# Patient Record
Sex: FEMALE | Race: WHITE | Employment: OTHER | URBAN - METROPOLITAN AREA
[De-identification: names, ages, dates, MRNs, and addresses within clinical notes are randomized per-mention and may not be internally consistent; named-entity substitution may affect disease eponyms.]

---

## 2017-10-23 ENCOUNTER — OP HISTORICAL/CONVERTED ENCOUNTER (OUTPATIENT)
Dept: OTHER | Age: 77
End: 2017-10-23

## 2018-03-17 ENCOUNTER — ED HISTORICAL/CONVERTED ENCOUNTER (OUTPATIENT)
Dept: OTHER | Age: 78
End: 2018-03-17

## 2018-05-02 ENCOUNTER — OP HISTORICAL/CONVERTED ENCOUNTER (OUTPATIENT)
Dept: OTHER | Age: 78
End: 2018-05-02

## 2019-05-14 ENCOUNTER — OP HISTORICAL/CONVERTED ENCOUNTER (OUTPATIENT)
Dept: OTHER | Age: 79
End: 2019-05-14

## 2020-08-04 LAB
ALBUMIN SERPL-MCNC: 4.6 G/DL (ref 3.7–4.7)
ALBUMIN/GLOB SERPL: 2 {RATIO} (ref 1.2–2.2)
ALP SERPL-CCNC: 76 IU/L (ref 39–117)
ALT SERPL-CCNC: 16 IU/L (ref 0–32)
AST SERPL-CCNC: 27 IU/L (ref 0–40)
BASOPHILS # BLD AUTO: 0.1 X10E3/UL (ref 0–0.2)
BASOPHILS NFR BLD AUTO: 1 %
BILIRUB SERPL-MCNC: 0.5 MG/DL (ref 0–1.2)
BUN SERPL-MCNC: 17 MG/DL (ref 8–27)
BUN/CREAT SERPL: 17 (ref 12–28)
CALCIUM SERPL-MCNC: 10.1 MG/DL (ref 8.7–10.3)
CHLORIDE SERPL-SCNC: 101 MMOL/L (ref 96–106)
CHOLEST SERPL-MCNC: 235 MG/DL (ref 100–199)
CO2 SERPL-SCNC: 22 MMOL/L (ref 20–29)
CREAT SERPL-MCNC: 0.99 MG/DL (ref 0.57–1)
EOSINOPHIL # BLD AUTO: 0.2 X10E3/UL (ref 0–0.4)
EOSINOPHIL NFR BLD AUTO: 3 %
ERYTHROCYTE [DISTWIDTH] IN BLOOD BY AUTOMATED COUNT: 13.2 % (ref 11.7–15.4)
GLOBULIN SER CALC-MCNC: 2.3 G/DL (ref 1.5–4.5)
GLUCOSE SERPL-MCNC: 110 MG/DL (ref 65–99)
HBA1C MFR BLD: 6 % (ref 4.8–5.6)
HCT VFR BLD AUTO: 42.5 % (ref 34–46.6)
HDLC SERPL-MCNC: 56 MG/DL
HGB BLD-MCNC: 14.1 G/DL (ref 11.1–15.9)
IMM GRANULOCYTES # BLD AUTO: 0 X10E3/UL (ref 0–0.1)
IMM GRANULOCYTES NFR BLD AUTO: 0 %
LDLC SERPL CALC-MCNC: 142 MG/DL (ref 0–99)
LYMPHOCYTES # BLD AUTO: 1.6 X10E3/UL (ref 0.7–3.1)
LYMPHOCYTES NFR BLD AUTO: 31 %
MCH RBC QN AUTO: 27.6 PG (ref 26.6–33)
MCHC RBC AUTO-ENTMCNC: 33.2 G/DL (ref 31.5–35.7)
MCV RBC AUTO: 83 FL (ref 79–97)
MONOCYTES # BLD AUTO: 0.4 X10E3/UL (ref 0.1–0.9)
MONOCYTES NFR BLD AUTO: 8 %
NEUTROPHILS # BLD AUTO: 3 X10E3/UL (ref 1.4–7)
NEUTROPHILS NFR BLD AUTO: 57 %
PLATELET # BLD AUTO: 272 X10E3/UL (ref 150–450)
POTASSIUM SERPL-SCNC: 5.2 MMOL/L (ref 3.5–5.2)
PROT SERPL-MCNC: 6.9 G/DL (ref 6–8.5)
RBC # BLD AUTO: 5.1 X10E6/UL (ref 3.77–5.28)
SODIUM SERPL-SCNC: 142 MMOL/L (ref 134–144)
T4 FREE SERPL-MCNC: 1.19 NG/DL (ref 0.82–1.77)
TRIGL SERPL-MCNC: 183 MG/DL (ref 0–149)
TSH SERPL DL<=0.005 MIU/L-ACNC: 1.2 UIU/ML (ref 0.45–4.5)
VLDLC SERPL CALC-MCNC: 37 MG/DL (ref 5–40)
WBC # BLD AUTO: 5.3 X10E3/UL (ref 3.4–10.8)

## 2020-08-05 NOTE — PROGRESS NOTES
This result is abnormal.  Notify the patient labs okay will discuss on return cholesterol borderline however watch low-cholesterol low-fat diet

## 2020-08-19 RX ORDER — LEVOTHYROXINE SODIUM 50 UG/1
TABLET ORAL
Qty: 90 TAB | Refills: 1 | Status: SHIPPED | OUTPATIENT
Start: 2020-08-19 | End: 2021-01-04 | Stop reason: SDUPTHER

## 2020-08-31 PROBLEM — J06.9 URI (UPPER RESPIRATORY INFECTION): Status: ACTIVE | Noted: 2020-08-31

## 2020-08-31 PROBLEM — E03.9 HYPOTHYROIDISM: Status: ACTIVE | Noted: 2020-08-31

## 2020-08-31 PROBLEM — H93.19 TINNITUS: Status: ACTIVE | Noted: 2020-08-31

## 2020-08-31 PROBLEM — R73.9 HYPERGLYCEMIA: Status: ACTIVE | Noted: 2020-08-31

## 2020-08-31 PROBLEM — R53.83 FATIGUE: Status: ACTIVE | Noted: 2020-08-31

## 2020-08-31 PROBLEM — M15.9 DEGENERATIVE JOINT DISEASE INVOLVING MULTIPLE JOINTS: Status: ACTIVE | Noted: 2020-08-31

## 2020-08-31 PROBLEM — J30.9 ALLERGIC RHINITIS: Status: ACTIVE | Noted: 2020-08-31

## 2020-08-31 PROBLEM — E78.5 HYPERLIPIDEMIA: Status: ACTIVE | Noted: 2020-08-31

## 2020-08-31 PROBLEM — F32.A DEPRESSIVE DISORDER: Status: ACTIVE | Noted: 2020-08-31

## 2020-09-02 ENCOUNTER — OFFICE VISIT (OUTPATIENT)
Dept: INTERNAL MEDICINE CLINIC | Age: 80
End: 2020-09-02
Payer: MEDICARE

## 2020-09-02 VITALS
SYSTOLIC BLOOD PRESSURE: 130 MMHG | BODY MASS INDEX: 24.03 KG/M2 | DIASTOLIC BLOOD PRESSURE: 72 MMHG | RESPIRATION RATE: 18 BRPM | TEMPERATURE: 98.1 F | HEART RATE: 101 BPM | HEIGHT: 62 IN | OXYGEN SATURATION: 98 % | WEIGHT: 130.6 LBS

## 2020-09-02 DIAGNOSIS — E78.00 PURE HYPERCHOLESTEROLEMIA: ICD-10-CM

## 2020-09-02 DIAGNOSIS — N39.0 RECURRENT UTI: ICD-10-CM

## 2020-09-02 DIAGNOSIS — E03.9 ACQUIRED HYPOTHYROIDISM: Primary | ICD-10-CM

## 2020-09-02 DIAGNOSIS — F32.A DEPRESSIVE DISORDER: ICD-10-CM

## 2020-09-02 DIAGNOSIS — M15.8 OTHER OSTEOARTHRITIS INVOLVING MULTIPLE JOINTS: ICD-10-CM

## 2020-09-02 DIAGNOSIS — R10.13 DYSPEPSIA: ICD-10-CM

## 2020-09-02 PROCEDURE — G8400 PT W/DXA NO RESULTS DOC: HCPCS | Performed by: INTERNAL MEDICINE

## 2020-09-02 PROCEDURE — G8420 CALC BMI NORM PARAMETERS: HCPCS | Performed by: INTERNAL MEDICINE

## 2020-09-02 PROCEDURE — G8427 DOCREV CUR MEDS BY ELIG CLIN: HCPCS | Performed by: INTERNAL MEDICINE

## 2020-09-02 PROCEDURE — G8536 NO DOC ELDER MAL SCRN: HCPCS | Performed by: INTERNAL MEDICINE

## 2020-09-02 PROCEDURE — 99214 OFFICE O/P EST MOD 30 MIN: CPT | Performed by: INTERNAL MEDICINE

## 2020-09-02 PROCEDURE — 1101F PT FALLS ASSESS-DOCD LE1/YR: CPT | Performed by: INTERNAL MEDICINE

## 2020-09-02 PROCEDURE — 1090F PRES/ABSN URINE INCON ASSESS: CPT | Performed by: INTERNAL MEDICINE

## 2020-09-02 PROCEDURE — G9717 DOC PT DX DEP/BP F/U NT REQ: HCPCS | Performed by: INTERNAL MEDICINE

## 2020-09-02 NOTE — PROGRESS NOTES
Luisana Strauss is a 79 y.o. female and presents with Follow Up Chronic Condition and Cholesterol Problem  Patient presents for follow-up we spoke about 45 minutes she is considering relocation to [facility] where she will be 10 minutes away from her son. Other family members have passed away here we discussed her 's long illness with dementia her daughters loss due to neuromuscular disorder patient seems to be coping fairly well with doxepin she sees [place] [facility] for history of bladder polyps and no evidence of malignancy noted she has incomplete bilateral emptying at times nocturia and recurrent UTIs for which she tries to push fluids and now is on vaginal Premarin compounded estrogen to see if that would help we discussed the medication her OB/GYN suggested that she take it twice a week only. Continues on doxepin Levoxyl 50 mcg a day pantoprazole pilocarpine vitamins. We discussed last labs her history of hyperglycemia and hyperlipidemia in the past which is resolved-she wishes to wait until return to have laboratory studies obtained she wears corrective lenses she looks younger than her age of 79 she exercises plays golf we discussed preventive care-she has stigmata of Octavio's nodes of osteoarthritis with some synovial thickness ring finger right hand which she was concerned about not significantly painful she has some limitation in the range of motion due to the DJD but I advised her that she is more or less asymptomatic and it would be best to perhaps leave things alone she will take Tylenol as needed           Review of Systems  Constitutional: negative for fevers, chills, anorexia, weight loss and fatigue,no insomnia  Eyes:   negative for visual disturbance and irritation, eye discharge, eye pain. no eye redness. ENT:   negative for tinnitus, sore throat, nasal congestion, ear pain, hoarseness, hearing loss.,no snoring.   Respiratory:  negative for cough, hemoptysis, shortness of breath, wheezing,  CV:   negative for chest pain, palpitations, lower extremity edema, shortness of breath while sitting, walking or at night  GI:   negative for nausea, vomiting, diarrhea, abdominal pain,melena,constipation. Endo:               negative for polyuria, polydipsia, polyphagia, cold or heat intolerance,hair loss. Genitourinary: negative for frequency, dysuria and hematuria,urethral discharge,nocturia.straining while urination,urinary incontinence. Integument:  negative for rash and pruritus  Hematologic:  negative for easy bruising and gum/nose bleeding, enlarged nodes  Musculoskel: negative for myalgias, arthralgias, back pain, muscle weakness, joint pain, h/o fall,cramps,calf pain. Neurological:  negative for headaches, dizziness, vertigo, memory problems, gait and seizures loss of consciousness,no ataxia.   Behavl/Psych: negative for feelings of anxiety,occasional episodes depression, mood changes ,sadness    Past Medical History:   Diagnosis Date    Allergic rhinitis 8/31/2020    Degenerative joint disease involving multiple joints 8/31/2020    Depressive disorder 8/31/2020    Fatigue 8/31/2020    Hyperglycemia 8/31/2020    Hyperlipidemia 8/31/2020    Hypothyroidism 8/31/2020    Tinnitus 8/31/2020    URI (upper respiratory infection) 8/31/2020     Past Surgical History:   Procedure Laterality Date    ABDOMEN SURGERY PROC UNLISTED      abdoninoplasty    HX APPENDECTOMY      HX BREAST LUMPECTOMY Right     HX COLONOSCOPY  2019    HX OTHER SURGICAL      Repair of canthus with graft of skin    HX TONSILLECTOMY       Social History     Socioeconomic History    Marital status:      Spouse name: Not on file    Number of children: Not on file    Years of education: Not on file    Highest education level: Not on file   Tobacco Use    Smoking status: Never Smoker    Smokeless tobacco: Never Used     Family History   Problem Relation Age of Onset    Stroke Mother     Heart Attack Father     Cancer Father     Hypertension Sister      Current Outpatient Medications   Medication Sig Dispense Refill    doxepin (SINEquan) 25 mg capsule TAKE 1 CAPSULE EVERY MORNING AND TAKE 3 CAPSULES EVERY EVENING 360 Cap 1    LevoxyL 50 mcg tablet TAKE 1 TABLET EVERY DAY 90 Tab 1     Allergies   Allergen Reactions    Latex Unknown (comments)    Amoxicillin Unknown (comments)    Codeine Other (comments)    Sulfa (Sulfonamide Antibiotics) Itching       Objective:  Visit Vitals  /72 (BP 1 Location: Right arm, BP Patient Position: Sitting)   Pulse (!) 101   Temp 98.1 °F (36.7 °C) (Oral)   Resp 18   Ht 5' 2\" (1.575 m)   Wt 130 lb 9.6 oz (59.2 kg)   SpO2 98%   BMI 23.89 kg/m²       Physical Exam:   Constitutional: General Appearance: healthy-appearing and obese. Level of Distress: NAD. Ambulation: ambulating normally. Psychiatric: Mental Status: normal mood and affect and active and alert. Orientation: to time, place, and person. no agitation. ,normal eye contact. normal insight  Head: Head: normocephalic and atraumatic. Eyes: Pupils: PERRLA. Sclerae: non-icteric. ENMT: No lesions on external ear, no hearing loss. No lesions on external nose, sinus tenderness, or nasal discharge. Lips, Teeth, and no mouth or lip ulcers   Neck: Neck: supple, trachea midline, and no masses. Lymph Nodes: no cervical LAD. Thyroid: no enlargement or nodules and non-tender. Lungs: Respiratory effort: no dyspnea. Auscultation: no wheezing, rales/crackles, or rhonchi and breath sounds normal and good air movement  Cardiovascular: Apical Impulse: not displaced. Heart Auscultation: normal S1 and S2; no murmurs, rubs, or gallops; and RRR. Neck vessels: no carotid bruits. Pulses including femoral / pedal: normal throughout. Abdomen: Bowel Sounds: normal. Inspection and Palpation: no tenderness, guarding, or masses and soft and non-distended. Liver: non-tender and no hepatomegaly.  Spleen: non-tender and no splenomegaly. dyspesia noted  Musculoskeletal[de-identified] Extremities: no edema or varicosities. Calf tenderness. Neurologic: Gait and Station: normal gait and station. Motor Strength normal right and left. Sensory and cerebellar intact. Skin: Inspection and palpation: no rash, lesions, or ulcer. Results for orders placed or performed in visit on 08/03/20   TSH AND FREE T4   Result Value Ref Range    TSH 1.200 0.450 - 4.500 uIU/mL    T4, Free 1.19 0.82 - 1.77 ng/dL   CBC WITH AUTOMATED DIFF   Result Value Ref Range    WBC 5.3 3.4 - 10.8 x10E3/uL    RBC 5.10 3.77 - 5.28 x10E6/uL    HGB 14.1 11.1 - 15.9 g/dL    HCT 42.5 34.0 - 46.6 %    MCV 83 79 - 97 fL    MCH 27.6 26.6 - 33.0 pg    MCHC 33.2 31.5 - 35.7 g/dL    RDW 13.2 11.7 - 15.4 %    PLATELET 843 980 - 801 x10E3/uL    NEUTROPHILS 57 Not Estab. %    Lymphocytes 31 Not Estab. %    MONOCYTES 8 Not Estab. %    EOSINOPHILS 3 Not Estab. %    BASOPHILS 1 Not Estab. %    ABS. NEUTROPHILS 3.0 1.4 - 7.0 x10E3/uL    Abs Lymphocytes 1.6 0.7 - 3.1 x10E3/uL    ABS. MONOCYTES 0.4 0.1 - 0.9 x10E3/uL    ABS. EOSINOPHILS 0.2 0.0 - 0.4 x10E3/uL    ABS. BASOPHILS 0.1 0.0 - 0.2 x10E3/uL    IMMATURE GRANULOCYTES 0 Not Estab. %    ABS. IMM. GRANS. 0.0 0.0 - 0.1 [id number]   METABOLIC PANEL, COMPREHENSIVE   Result Value Ref Range    Glucose 110 (H) 65 - 99 mg/dL    BUN 17 8 - 27 mg/dL    Creatinine 0.99 0.57 - 1.00 mg/dL    GFR est non-AA 54 (L) >59 mL/min/1.73    GFR est AA 63 >59 mL/min/1.73    BUN/Creatinine ratio 17 12 - 28    Sodium 142 134 - 144 mmol/L    Potassium 5.2 3.5 - 5.2 mmol/L    Chloride 101 96 - 106 mmol/L    CO2 22 20 - 29 mmol/L    Calcium 10.1 8.7 - 10.3 mg/dL    Protein, total 6.9 6.0 - 8.5 g/dL    Albumin 4.6 3.7 - 4.7 g/dL    GLOBULIN, TOTAL 2.3 1.5 - 4.5 g/dL    A-G Ratio 2.0 1.2 - 2.2    Bilirubin, total 0.5 0.0 - 1.2 mg/dL    Alk.  phosphatase 76 39 - 117 IU/L    AST (SGOT) 27 0 - 40 IU/L    ALT (SGPT) 16 0 - 32 IU/L   LIPID PANEL   Result Value Ref Range Cholesterol, total 235 (H) 100 - 199 mg/dL    Triglyceride 183 (H) 0 - 149 mg/dL    HDL Cholesterol 56 >39 mg/dL    VLDL, calculated 37 5 - 40 mg/dL    LDL, calculated 142 (H) 0 - 99 mg/dL   HEMOGLOBIN A1C W/O EAG   Result Value Ref Range    Hemoglobin A1c 6.0 (H) 4.8 - 5.6 %       Assessment/Plan:    ICD-10-CM ICD-9-CM    1. Acquired hypothyroidism  E03.9 244.9    2. Dyspepsia  R10.13 536.8    3. Recurrent UTI  N39.0 599.0    4. Other osteoarthritis involving multiple joints  M15.8 715.89    5. Pure hypercholesterolemia  E78.00 272.0    6. Depressive disorder  F32.9 311      No orders of the defined types were placed in this encounter. continue present plan, call if any problems    There are no Patient Instructions on file for this visit. Follow-up and Dispositions    · Return in about 6 months (around 3/2/2021).

## 2020-09-03 RX ORDER — DOXEPIN HYDROCHLORIDE 25 MG/1
CAPSULE ORAL
Qty: 360 CAP | Refills: 1 | Status: SHIPPED | OUTPATIENT
Start: 2020-09-03 | End: 2021-01-04 | Stop reason: SDUPTHER

## 2021-01-04 ENCOUNTER — TELEPHONE (OUTPATIENT)
Dept: INTERNAL MEDICINE CLINIC | Age: 81
End: 2021-01-04

## 2021-01-04 RX ORDER — ESTRADIOL 0.1 MG/G
2 CREAM VAGINAL DAILY
Qty: 180 G | Refills: 1 | Status: SHIPPED | OUTPATIENT
Start: 2021-01-04 | End: 2021-07-03

## 2021-01-04 RX ORDER — DOXEPIN HYDROCHLORIDE 25 MG/1
CAPSULE ORAL
Qty: 360 CAP | Refills: 1 | Status: SHIPPED | OUTPATIENT
Start: 2021-01-04 | End: 2021-06-18

## 2021-01-04 RX ORDER — PILOCARPINE HYDROCHLORIDE 5 MG/1
5 TABLET, FILM COATED ORAL 3 TIMES DAILY
Qty: 270 TAB | Refills: 1 | Status: SHIPPED | OUTPATIENT
Start: 2021-01-04 | End: 2021-03-27

## 2021-01-04 RX ORDER — PANTOPRAZOLE SODIUM 40 MG/1
40 TABLET, DELAYED RELEASE ORAL DAILY
Qty: 90 TAB | Refills: 1 | Status: SHIPPED | OUTPATIENT
Start: 2021-01-04 | End: 2021-03-27

## 2021-01-04 RX ORDER — LEVOTHYROXINE SODIUM 50 UG/1
50 TABLET ORAL DAILY
Qty: 90 TAB | Refills: 1 | Status: SHIPPED | OUTPATIENT
Start: 2021-01-04 | End: 2021-03-27

## 2021-01-04 NOTE — TELEPHONE ENCOUNTER
Patient called stating that her prescription coverage has changed and her prescriptions need to go to [facility]:    Patient needs prescription sent for:    Estradiol Vaginal Cream 0.1 mg    Levoxyl 50 mcg tablets    Pantoprazole DR 40 mg tablets    Doxepin 25 mg capsules    Pilocarpine 5 mg tablets    Needs all new prescriptions sent to this pharmacy

## 2021-01-28 ENCOUNTER — IMMUNIZATION (OUTPATIENT)
Dept: FAMILY MEDICINE CLINIC | Age: 81
End: 2021-01-28
Payer: MEDICARE

## 2021-01-28 DIAGNOSIS — Z23 ENCOUNTER FOR IMMUNIZATION: Primary | ICD-10-CM

## 2021-01-28 PROCEDURE — 91301 COVID-19, MRNA, LNP-S, PF, 100MCG/0.5ML DOSE(MODERNA): CPT | Performed by: FAMILY MEDICINE

## 2021-02-26 ENCOUNTER — IMMUNIZATION (OUTPATIENT)
Dept: FAMILY MEDICINE CLINIC | Age: 81
End: 2021-02-26
Payer: MEDICARE

## 2021-02-26 DIAGNOSIS — Z23 ENCOUNTER FOR IMMUNIZATION: Primary | ICD-10-CM

## 2021-02-26 PROCEDURE — 0012A COVID-19, MRNA, LNP-S, PF, 100MCG/0.5ML DOSE(MODERNA): CPT | Performed by: FAMILY MEDICINE

## 2021-02-26 PROCEDURE — 91301 COVID-19, MRNA, LNP-S, PF, 100MCG/0.5ML DOSE(MODERNA): CPT | Performed by: FAMILY MEDICINE

## 2021-03-02 ENCOUNTER — OFFICE VISIT (OUTPATIENT)
Dept: INTERNAL MEDICINE CLINIC | Age: 81
End: 2021-03-02
Payer: MEDICARE

## 2021-03-02 VITALS
WEIGHT: 128.6 LBS | HEIGHT: 62 IN | DIASTOLIC BLOOD PRESSURE: 82 MMHG | TEMPERATURE: 98.6 F | OXYGEN SATURATION: 96 % | RESPIRATION RATE: 18 BRPM | SYSTOLIC BLOOD PRESSURE: 136 MMHG | BODY MASS INDEX: 23.66 KG/M2 | HEART RATE: 106 BPM

## 2021-03-02 DIAGNOSIS — M15.8 OTHER OSTEOARTHRITIS INVOLVING MULTIPLE JOINTS: ICD-10-CM

## 2021-03-02 DIAGNOSIS — R53.83 FATIGUE, UNSPECIFIED TYPE: ICD-10-CM

## 2021-03-02 DIAGNOSIS — E78.00 PURE HYPERCHOLESTEROLEMIA: ICD-10-CM

## 2021-03-02 DIAGNOSIS — R73.9 HYPERGLYCEMIA: Primary | ICD-10-CM

## 2021-03-02 PROCEDURE — G9717 DOC PT DX DEP/BP F/U NT REQ: HCPCS | Performed by: INTERNAL MEDICINE

## 2021-03-02 PROCEDURE — G8536 NO DOC ELDER MAL SCRN: HCPCS | Performed by: INTERNAL MEDICINE

## 2021-03-02 PROCEDURE — 1101F PT FALLS ASSESS-DOCD LE1/YR: CPT | Performed by: INTERNAL MEDICINE

## 2021-03-02 PROCEDURE — G8400 PT W/DXA NO RESULTS DOC: HCPCS | Performed by: INTERNAL MEDICINE

## 2021-03-02 PROCEDURE — 99214 OFFICE O/P EST MOD 30 MIN: CPT | Performed by: INTERNAL MEDICINE

## 2021-03-02 PROCEDURE — G8420 CALC BMI NORM PARAMETERS: HCPCS | Performed by: INTERNAL MEDICINE

## 2021-03-02 PROCEDURE — G8427 DOCREV CUR MEDS BY ELIG CLIN: HCPCS | Performed by: INTERNAL MEDICINE

## 2021-03-02 PROCEDURE — 1090F PRES/ABSN URINE INCON ASSESS: CPT | Performed by: INTERNAL MEDICINE

## 2021-03-02 RX ORDER — ASPIRIN 81 MG/1
81 TABLET ORAL DAILY
COMMUNITY

## 2021-03-02 RX ORDER — GLUCOSAMINE/CHONDR SU A SOD 750-600 MG
1 TABLET ORAL DAILY
COMMUNITY

## 2021-03-02 NOTE — PROGRESS NOTES
1. Have you been to the ER, urgent care clinic since your last visit? Hospitalized since your last visit? No    2. Have you seen or consulted any other health care providers outside of the [address] [place] [address] since your last visit? Include any pap smears or colon screening.  Yes GYN Dr. Jovanni Montalvo      Chief Complaint   Patient presents with    Follow-up    Cholesterol Problem    Blood sugar problem    Thyroid Problem       Visit Vitals  BP (!) 142/82 (BP 1 Location: Right arm, BP Patient Position: Sitting, BP Cuff Size: Adult)   Pulse (!) 106   Temp 98.6 °F (37 °C) (Oral)   Resp 18   Ht 5' 2\" (1.575 m)   Wt 128 lb 9.6 oz (58.3 kg)   SpO2 96%   BMI 23.52 kg/m²

## 2021-03-02 NOTE — PROGRESS NOTES
For this [place] is a 79 y.o. female and presents with Follow-up, Cholesterol Problem, Blood sugar problem, and Thyroid Problem  Patient presents for follow-up saddened by the loss of many of her friends including daughter and  and close neighbors she frequents her son who lives in [place] and has a house they are now she will be coming back and forth during this year to her home in [place] but ultimately by the years and will probably relocate permanently in [place] we discussed her situational concerns love ones lost last labs performed August last year yielding a TSH 1.2 free T4 1.19 white count 5300 hemoglobin 14.1 platelet count 327,137 glucose 110 creatinine 0.99 sodium 142 potassium 5.2 CO2 22 calcium 10.1 SGOT 27 SGPT 16 cholesterol 235 HDL 56  hemoglobin A1c 6.0 she was agreeable for the office phlebotomist to draw blood for the studies below and to recheck hemoglobin A1c her son does have diabetes patient also sees L&D. He is mammography unit and is being evaluated for probable breast cyst under the right areola. There is being monitored by them and felt to be benign lungs clear heart rate regular rate and rhythm the patient is an avid golfer continues on levothyroxine for thyroid disease has some Octavio's nodes of osteoarthritis we discussed preventive care issues she wished no colonoscopy at the time is no melena hematochezia and she feels fairly well           Review of Systems  Constitutional: negative for fevers, chills, anorexia, weight loss and noted fatigue,no insomnia  Eyes:   negative for visual disturbance and irritation, eye discharge, eye pain. no eye redness. ENT:   negative for tinnitus, sore throat, nasal congestion, ear pain, hoarseness, hearing loss.,no snoring.   Respiratory:  negative for cough, hemoptysis, shortness of breath, wheezing,  CV:   negative for chest pain, palpitations, lower extremity edema, shortness of breath while sitting, walking or at night  GI:   negative for nausea, vomiting, diarrhea, abdominal pain,melena,constipation. Endo:               negative for polyuria, polydipsia, polyphagia, cold or heat intolerance,hair loss. Genitourinary: negative for frequency, dysuria and hematuria,urethral discharge,nocturia.straining while urination,urinary incontinence. Integument:  negative for rash and pruritus  Hematologic:  negative for easy bruising and gum/nose bleeding, enlarged nodes  Musculoskel: negative for myalgias, arthralgias, back pain, muscle weakness, joint pain, h/o fall,cramps,calf pain. DJD or headaches, dizziness, vertigo, memory problems, gait and seizures loss of consciousness,no ataxia.   Behavl/Psych: negative for feelings of anxiety, depression, mood changes ,sadness    Past Medical History:   Diagnosis Date    Allergic rhinitis 8/31/2020    Degenerative joint disease involving multiple joints 8/31/2020    Depressive disorder 8/31/2020    Fatigue 8/31/2020    Hyperglycemia 8/31/2020    Hyperlipidemia 8/31/2020    Hypothyroidism 8/31/2020    Tinnitus 8/31/2020    URI (upper respiratory infection) 8/31/2020     Past Surgical History:   Procedure Laterality Date    HX APPENDECTOMY      HX BREAST LUMPECTOMY Right     HX COLONOSCOPY  2019    HX OTHER SURGICAL      Repair of canthus with graft of skin    HX TONSILLECTOMY      TX ABDOMEN SURGERY PROC UNLISTED      abdoninoplasty     Social History     Socioeconomic History    Marital status:      Spouse name: Not on file    Number of children: Not on file    Years of education: Not on file    Highest education level: Not on file   Tobacco Use    Smoking status: Never Smoker    Smokeless tobacco: Never Used     Family History   Problem Relation Age of Onset    Stroke Mother     Heart Attack Father     Cancer Father     Hypertension Sister      Current Outpatient Medications   Medication Sig Dispense Refill    aspirin delayed-release 81 mg tablet Take 81 mg by mouth daily.  Biotin 2,500 mcg cap Take 1 Cap by mouth daily.  doxepin (SINEquan) 25 mg capsule Take 1 capsule every morning and 3 capsules every night 360 Cap 1    levothyroxine (LevoxyL) 50 mcg tablet Take 1 Tab by mouth daily for 180 days. 90 Tab 1    pantoprazole (PROTONIX) 40 mg tablet Take 1 Tab by mouth daily for 180 days. 90 Tab 1    pilocarpine (SALAGEN) 5 mg tablet Take 1 Tab by mouth three (3) times daily for 180 days. 270 Tab 1    estradioL (ESTRACE) 0.01 % (0.1 mg/gram) vaginal cream Insert 2 g into vagina daily for 180 days. 180 g 1     Allergies   Allergen Reactions    Latex Unknown (comments)    Amoxicillin Unknown (comments)    Codeine Other (comments)    Sulfa (Sulfonamide Antibiotics) Itching       Objective:  Visit Vitals  /82 (BP 1 Location: Right arm, BP Patient Position: Sitting, BP Cuff Size: Adult)   Pulse (!) 106   Temp 98.6 °F (37 °C) (Oral)   Resp 18   Ht 5' 2\" (1.575 m)   Wt 128 lb 9.6 oz (58.3 kg)   SpO2 96%   BMI 23.52 kg/m²       Physical Exam:   Constitutional: General Appearance: healthy-appearing and obese. Level of Distress: NAD. Ambulation: ambulating normally. Psychiatric: Mental Status: normal mood and affect and active and alert. Orientation: to time, place, and person. no agitation. ,normal eye contact. normal insight  Head: Head: normocephalic and atraumatic. Eyes: Pupils: PERRLA. Sclerae: non-icteric. ENMT: No lesions on external ear, no hearing loss. No lesions on external nose, sinus tenderness, or nasal discharge. Lips, Teeth, and no mouth or lip ulcers   Neck: Neck: supple, trachea midline, and no masses. Lymph Nodes: no cervical LAD. Thyroid: no enlargement or nodules and non-tender. Lungs: Respiratory effort: no dyspnea. Auscultation: no wheezing, rales/crackles, or rhonchi and breath sounds normal and good air movement. Cardiovascular: Apical Impulse: not displaced.  Heart Auscultation: normal S1 and S2; no murmurs, rubs, or gallops; and RRR. Neck vessels: no carotid bruits. Pulses including femoral / pedal: normal throughout. Abdomen: Bowel Sounds: normal. Inspection and Palpation: no tenderness, guarding, or masses and soft and non-distended. Liver: non-tender  Musculoskeletal[de-identified] Extremities: no edema or varicosities. Calf tenderness. DJD changes  Neurologic: Gait and Station: normal gait and station. Motor Strength normal right and left. Sensory and cerebellar intact. Skin: Inspection and palpation: no rash, lesions, or ulcer. Results for orders placed or performed in visit on 08/03/20   TSH AND FREE T4   Result Value Ref Range    TSH 1.200 0.450 - 4.500 uIU/mL    T4, Free 1.19 0.82 - 1.77 ng/dL   CBC WITH AUTOMATED DIFF   Result Value Ref Range    WBC 5.3 3.4 - 10.8 x10E3/uL    RBC 5.10 3.77 - 5.28 x10E6/uL    HGB 14.1 11.1 - 15.9 g/dL    HCT 42.5 34.0 - 46.6 %    MCV 83 79 - 97 fL    MCH 27.6 26.6 - 33.0 pg    MCHC 33.2 31.5 - 35.7 g/dL    RDW 13.2 11.7 - 15.4 %    PLATELET 500 449 - 698 x10E3/uL    NEUTROPHILS 57 Not Estab. %    Lymphocytes 31 Not Estab. %    MONOCYTES 8 Not Estab. %    EOSINOPHILS 3 Not Estab. %    BASOPHILS 1 Not Estab. %    ABS. NEUTROPHILS 3.0 1.4 - 7.0 x10E3/uL    Abs Lymphocytes 1.6 0.7 - 3.1 x10E3/uL    ABS. MONOCYTES 0.4 0.1 - 0.9 x10E3/uL    ABS. EOSINOPHILS 0.2 0.0 - 0.4 x10E3/uL    ABS. BASOPHILS 0.1 0.0 - 0.2 x10E3/uL    IMMATURE GRANULOCYTES 0 Not Estab. %    ABS. IMM.  GRANS. 0.0 0.0 - 0.1 [id number]   METABOLIC PANEL, COMPREHENSIVE   Result Value Ref Range    Glucose 110 (H) 65 - 99 mg/dL    BUN 17 8 - 27 mg/dL    Creatinine 0.99 0.57 - 1.00 mg/dL    GFR est non-AA 54 (L) >59 mL/min/1.73    GFR est AA 63 >59 mL/min/1.73    BUN/Creatinine ratio 17 12 - 28    Sodium 142 134 - 144 mmol/L    Potassium 5.2 3.5 - 5.2 mmol/L    Chloride 101 96 - 106 mmol/L    CO2 22 20 - 29 mmol/L    Calcium 10.1 8.7 - 10.3 mg/dL    Protein, total 6.9 6.0 - 8.5 g/dL    Albumin 4.6 3.7 - 4.7 g/dL    GLOBULIN, TOTAL 2.3 1.5 - 4.5 g/dL    A-G Ratio 2.0 1.2 - 2.2    Bilirubin, total 0.5 0.0 - 1.2 mg/dL    Alk. phosphatase 76 39 - 117 IU/L    AST (SGOT) 27 0 - 40 IU/L    ALT (SGPT) 16 0 - 32 IU/L   LIPID PANEL   Result Value Ref Range    Cholesterol, total 235 (H) 100 - 199 mg/dL    Triglyceride 183 (H) 0 - 149 mg/dL    HDL Cholesterol 56 >39 mg/dL    VLDL, calculated 37 5 - 40 mg/dL    LDL, calculated 142 (H) 0 - 99 mg/dL   HEMOGLOBIN A1C W/O EAG   Result Value Ref Range    Hemoglobin A1c 6.0 (H) 4.8 - 5.6 %       Assessment/Plan:    ICD-10-CM ICD-9-CM    1. Hyperglycemia  R73.9 790.29 HEMOGLOBIN A1C WITH EAG      METABOLIC PANEL, COMPREHENSIVE   2. Pure hypercholesterolemia  E78.00 272.0 TSH 3RD GENERATION      T4, FREE      LIPID PANEL   3. Other osteoarthritis involving multiple joints  M15.8 715.89    4. Fatigue, unspecified type  R53.83 780.79 CBC WITH AUTOMATED DIFF      METABOLIC PANEL, COMPREHENSIVE      TSH 3RD GENERATION      T4, FREE      LIPID PANEL     Orders Placed This Encounter    CBC WITH AUTOMATED DIFF    HEMOGLOBIN A1C WITH EAG    METABOLIC PANEL, COMPREHENSIVE    TSH 3RD GENERATION    T4, FREE    LIPID PANEL    aspirin delayed-release 81 mg tablet     Sig: Take 81 mg by mouth daily.  Biotin 2,500 mcg cap     Sig: Take 1 Cap by mouth daily. routine labs ordered, call if any problems    There are no Patient Instructions on file for this visit. Follow-up and Dispositions    · Return in about 6 months (around 9/2/2021).

## 2021-03-04 LAB
ALBUMIN SERPL-MCNC: 4.8 G/DL (ref 3.7–4.7)
ALBUMIN/GLOB SERPL: 2 {RATIO} (ref 1.2–2.2)
ALP SERPL-CCNC: 86 IU/L (ref 39–117)
ALT SERPL-CCNC: 19 IU/L (ref 0–32)
AST SERPL-CCNC: 27 IU/L (ref 0–40)
BASOPHILS # BLD AUTO: 0 X10E3/UL (ref 0–0.2)
BASOPHILS NFR BLD AUTO: 1 %
BILIRUB SERPL-MCNC: 0.4 MG/DL (ref 0–1.2)
BUN SERPL-MCNC: 15 MG/DL (ref 8–27)
BUN/CREAT SERPL: 16 (ref 12–28)
CALCIUM SERPL-MCNC: 10.9 MG/DL (ref 8.7–10.3)
CHLORIDE SERPL-SCNC: 99 MMOL/L (ref 96–106)
CHOLEST SERPL-MCNC: 238 MG/DL (ref 100–199)
CO2 SERPL-SCNC: 24 MMOL/L (ref 20–29)
CREAT SERPL-MCNC: 0.92 MG/DL (ref 0.57–1)
EOSINOPHIL # BLD AUTO: 0.2 X10E3/UL (ref 0–0.4)
EOSINOPHIL NFR BLD AUTO: 4 %
ERYTHROCYTE [DISTWIDTH] IN BLOOD BY AUTOMATED COUNT: 13 % (ref 11.7–15.4)
EST. AVERAGE GLUCOSE BLD GHB EST-MCNC: 120 MG/DL
GLOBULIN SER CALC-MCNC: 2.4 G/DL (ref 1.5–4.5)
GLUCOSE SERPL-MCNC: 94 MG/DL (ref 65–99)
HBA1C MFR BLD: 5.8 % (ref 4.8–5.6)
HCT VFR BLD AUTO: 43.9 % (ref 34–46.6)
HDLC SERPL-MCNC: 47 MG/DL
HGB BLD-MCNC: 14.6 G/DL (ref 11.1–15.9)
IMM GRANULOCYTES # BLD AUTO: 0 X10E3/UL (ref 0–0.1)
IMM GRANULOCYTES NFR BLD AUTO: 1 %
LDLC SERPL CALC-MCNC: 134 MG/DL (ref 0–99)
LYMPHOCYTES # BLD AUTO: 1.8 X10E3/UL (ref 0.7–3.1)
LYMPHOCYTES NFR BLD AUTO: 31 %
MCH RBC QN AUTO: 28 PG (ref 26.6–33)
MCHC RBC AUTO-ENTMCNC: 33.3 G/DL (ref 31.5–35.7)
MCV RBC AUTO: 84 FL (ref 79–97)
MONOCYTES # BLD AUTO: 0.6 X10E3/UL (ref 0.1–0.9)
MONOCYTES NFR BLD AUTO: 11 %
NEUTROPHILS # BLD AUTO: 3 X10E3/UL (ref 1.4–7)
NEUTROPHILS NFR BLD AUTO: 52 %
PLATELET # BLD AUTO: 296 X10E3/UL (ref 150–450)
POTASSIUM SERPL-SCNC: 5.4 MMOL/L (ref 3.5–5.2)
PROT SERPL-MCNC: 7.2 G/DL (ref 6–8.5)
RBC # BLD AUTO: 5.21 X10E6/UL (ref 3.77–5.28)
SODIUM SERPL-SCNC: 140 MMOL/L (ref 134–144)
T4 FREE SERPL-MCNC: 1.36 NG/DL (ref 0.82–1.77)
TRIGL SERPL-MCNC: 319 MG/DL (ref 0–149)
TSH SERPL DL<=0.005 MIU/L-ACNC: 1.81 UIU/ML (ref 0.45–4.5)
VLDLC SERPL CALC-MCNC: 57 MG/DL (ref 5–40)
WBC # BLD AUTO: 5.6 X10E3/UL (ref 3.4–10.8)

## 2021-03-05 NOTE — PROGRESS NOTES
Notify the patient labs all normal but cholesterol borderline up but okay blood sugar normal thyroid normal everything generally good calcium a little up but it usually fluctuates up and down a little no need to take calcium pills

## 2021-03-27 RX ORDER — PANTOPRAZOLE SODIUM 40 MG/1
TABLET, DELAYED RELEASE ORAL
Qty: 90 TAB | Refills: 1 | Status: SHIPPED | OUTPATIENT
Start: 2021-03-27

## 2021-03-27 RX ORDER — LEVOTHYROXINE SODIUM 50 UG/1
TABLET ORAL
Qty: 90 TAB | Refills: 1 | Status: SHIPPED | OUTPATIENT
Start: 2021-03-27

## 2021-03-27 RX ORDER — PILOCARPINE HYDROCHLORIDE 5 MG/1
TABLET, FILM COATED ORAL
Qty: 270 TAB | Refills: 1 | Status: SHIPPED | OUTPATIENT
Start: 2021-03-27

## 2021-06-18 RX ORDER — DOXEPIN HYDROCHLORIDE 25 MG/1
CAPSULE ORAL
Qty: 120 CAPSULE | Refills: 0 | Status: SHIPPED | OUTPATIENT
Start: 2021-06-18 | End: 2021-07-29

## 2021-06-23 ENCOUNTER — TELEPHONE (OUTPATIENT)
Dept: INTERNAL MEDICINE CLINIC | Age: 81
End: 2021-06-23

## 2021-06-23 DIAGNOSIS — K21.9 GASTROESOPHAGEAL REFLUX DISEASE WITHOUT ESOPHAGITIS: Primary | ICD-10-CM

## 2021-06-23 RX ORDER — BISMUTH SUBSALICYLATE 262 MG/1
2 TABLET, CHEWABLE ORAL
Qty: 30 TABLET | Refills: 0 | Status: SHIPPED | OUTPATIENT
Start: 2021-06-23 | End: 2021-06-25

## 2021-06-23 NOTE — TELEPHONE ENCOUNTER
Patient called stating that she was having acid reflux and maybe some dysentery. She would like to know if there is something we could call in for her.

## 2021-07-10 ENCOUNTER — HOSPITAL ENCOUNTER (EMERGENCY)
Age: 81
Discharge: HOME | End: 2021-07-10
Attending: EMERGENCY MEDICINE
Payer: MEDICARE

## 2021-07-10 VITALS
DIASTOLIC BLOOD PRESSURE: 84 MMHG | OXYGEN SATURATION: 98 % | BODY MASS INDEX: 23 KG/M2 | HEIGHT: 62 IN | RESPIRATION RATE: 18 BRPM | HEART RATE: 73 BPM | SYSTOLIC BLOOD PRESSURE: 139 MMHG | WEIGHT: 125 LBS | TEMPERATURE: 97.8 F

## 2021-07-10 DIAGNOSIS — T78.40XA ALLERGIC REACTION, INITIAL ENCOUNTER: Primary | ICD-10-CM

## 2021-07-10 DIAGNOSIS — Z91.013 SEAFOOD ALLERGY: ICD-10-CM

## 2021-07-10 PROCEDURE — 96374 THER/PROPH/DIAG INJ IV PUSH: CPT

## 2021-07-10 PROCEDURE — 74011250636 HC RX REV CODE- 250/636: Performed by: EMERGENCY MEDICINE

## 2021-07-10 PROCEDURE — 96375 TX/PRO/DX INJ NEW DRUG ADDON: CPT

## 2021-07-10 PROCEDURE — 99284 EMERGENCY DEPT VISIT MOD MDM: CPT

## 2021-07-10 RX ORDER — DEXAMETHASONE SODIUM PHOSPHATE 4 MG/ML
10 INJECTION, SOLUTION INTRA-ARTICULAR; INTRALESIONAL; INTRAMUSCULAR; INTRAVENOUS; SOFT TISSUE ONCE
Status: COMPLETED | OUTPATIENT
Start: 2021-07-10 | End: 2021-07-10

## 2021-07-10 RX ADMIN — FAMOTIDINE 20 MG: 10 INJECTION, SOLUTION INTRAVENOUS at 07:30

## 2021-07-10 RX ADMIN — DEXAMETHASONE SODIUM PHOSPHATE 10 MG: 4 INJECTION, SOLUTION INTRA-ARTICULAR; INTRALESIONAL; INTRAMUSCULAR; INTRAVENOUS; SOFT TISSUE at 07:24

## 2021-07-10 NOTE — ED PROVIDER NOTES
EMERGENCY DEPARTMENT HISTORY AND PHYSICAL EXAM      Date: 7/10/2021  Patient Name: [place] Lucia    History of Presenting Illness     Chief Complaint   Patient presents with    Allergic Reaction    Rash       History Provided By: Patient    HPI: [place], [de-identified] y.o. female presents to the ED with cc of   Chief Complaint   Patient presents with    Allergic Reaction    Rash     Service: 07/10/21 0620   Signed     Pt woke up around 0300 with rash to chest, pt denies new medications. Pt reports eating shrimp for denies hx of allergy to shellfish. Pt also reports a tickle in her throat. Denies SOB, chest pain or difficulty swallowing. 50 mg PO Benadryl taken prior to EMS arrival.            Moderate severity, no known exacerbating or relieving factors, no other associated signs and symptoms. There are no other complaints, changes, or physical findings at this time. PCP: Blas Cruz MD    No current facility-administered medications on file prior to encounter. Current Outpatient Medications on File Prior to Encounter   Medication Sig Dispense Refill    doxepin (SINEquan) 25 mg capsule TAKE 1 CAPSULE EVERY       MORNING AND 3 CAPSULES     EVERY NIGHT 120 Capsule 0    LevoxyL 50 mcg tablet TAKE 1 TABLET DAILY 90 Tab 1    pantoprazole (PROTONIX) 40 mg tablet TAKE 1 TABLET DAILY 90 Tab 1    pilocarpine (SALAGEN) 5 mg tablet TAKE 1 TABLET 3 TIMES A  Tab 1    aspirin delayed-release 81 mg tablet Take 81 mg by mouth daily.  Biotin 2,500 mcg cap Take 1 Cap by mouth daily.          Past History     Past Medical History:  Past Medical History:   Diagnosis Date    Allergic rhinitis 8/31/2020    Degenerative joint disease involving multiple joints 8/31/2020    Depressive disorder 8/31/2020    Fatigue 8/31/2020    Hyperglycemia 8/31/2020    Hyperlipidemia 8/31/2020    Hypothyroidism 8/31/2020    Tinnitus 8/31/2020    URI (upper respiratory infection) 8/31/2020       Past Surgical History:  Past Surgical History:   Procedure Laterality Date    HX APPENDECTOMY      HX BREAST LUMPECTOMY Right     HX COLONOSCOPY  2019    HX OTHER SURGICAL      Repair of canthus with graft of skin    HX TONSILLECTOMY      GA ABDOMEN SURGERY PROC UNLISTED      abdoninoplasty       Family History:  Family History   Problem Relation Age of Onset    Stroke Mother     Heart Attack Father     Cancer Father     Hypertension Sister        Social History:  Social History     Tobacco Use    Smoking status: Never Smoker    Smokeless tobacco: Never Used   Substance Use Topics    Alcohol use: Not on file    Drug use: Not on file       Allergies: Allergies   Allergen Reactions    Latex Unknown (comments)    Amoxicillin Unknown (comments)    Codeine Other (comments)    Sulfa (Sulfonamide Antibiotics) Itching         Review of Systems   Review of Systems   Constitutional: Negative. HENT: Negative for congestion, facial swelling, rhinorrhea and sore throat. Eyes: Negative. Negative for photophobia and pain. Respiratory: Negative for cough, shortness of breath and wheezing. Cardiovascular: Negative. Negative for chest pain. Gastrointestinal: Negative for abdominal distention and abdominal pain. Genitourinary: Negative. Musculoskeletal: Negative. Skin: Positive for rash. Allergic/Immunologic: Negative for immunocompromised state. Neurological: Negative. Negative for syncope and weakness. Hematological: Negative. Psychiatric/Behavioral: Negative. Physical Exam   Physical Exam  Vitals and nursing note reviewed. Constitutional:       Appearance: Normal appearance. She is normal weight. HENT:      Head: Normocephalic and atraumatic. Nose: No congestion or rhinorrhea. Eyes:      Extraocular Movements: Extraocular movements intact. Pupils: Pupils are equal, round, and reactive to light. Cardiovascular:      Rate and Rhythm: Normal rate and regular rhythm. Pulmonary:      Effort: Pulmonary effort is normal.      Breath sounds: Normal breath sounds. Abdominal:      General: Abdomen is flat. Bowel sounds are normal. There is no distension. Tenderness: There is no abdominal tenderness. There is no guarding. Musculoskeletal:         General: Normal range of motion. Cervical back: Normal range of motion and neck supple. Skin:     General: Skin is warm and dry. Comments: Mild erythema and right and left upper arm     Neurological:      General: No focal deficit present. Mental Status: She is alert and oriented to person, place, and time. Psychiatric:         Mood and Affect: Mood normal.         Diagnostic Study Results     Labs -   No results found for this or any previous visit (from the past 12 hour(s)). Labs reviewed by me    Radiologic Studies -   No orders to display     CT Results  (Last 48 hours)    None        CXR Results  (Last 48 hours)    None            Medical Decision Making     I am the first provider for this patient. I reviewed the vital signs, available nursing notes, past medical history, past surgical history, family history and social history. RADIOLOGY report and LABS reviewed by me    Vital Signs-Reviewed the patient's vital signs. Patient Vitals for the past 12 hrs:   Temp Pulse Resp BP SpO2   07/10/21 0708  73 18 139/84 98 %   07/10/21 0621 97.8 °F (36.6 °C) 91 19 (!) 171/82 98 %       EKG interpretation: (Preliminary)      Records Reviewed: Nurse's note. Provider Notes (Medical Decision Making):    Patient presents with DIFF DX :        ED Course:   Initial assessment performed. The patients presenting problems have been discussed, and they are in agreement with the care plan formulated and outlined with them. I have encouraged them to ask questions as they arise throughout their visit.        Patient feels better advised to return if symptoms persist or worsen follow-up with PCP in 1 to 2 days          TREATMENT RESPONSE -Stable          Milka Gaxiola MD      Disposition:  Discharged   Diagnostic tests were reviewed and questions answered. Diagnosis, care plan and treatment options were discussed. The patient understand instructions and will follow up as directed. Condition stable    Admitting Provider:  No admitting provider for patient encounter. Consulting Provider:  No ref. provider found       DISCHARGE PLAN:  1. Current Discharge Medication List        2. Follow-up Information     Follow up With Specialties Details Why Contact Info    Kevin Barragan MD Internal Medicine In 2 days  [address]  [phone]          3. Return to ED if worse     Diagnosis     Clinical Impression:     ICD-10-CM ICD-9-CM    1. Allergic reaction, initial encounter  T78.40XA 995.3    2. Seafood allergy  Z91.013 V15.04         Attestations:    Milka Gaxiola MD    Please note that this dictation was completed with Vusion, the computer voice recognition software. Quite often unanticipated grammatical, syntax, homophones, and other interpretive errors are inadvertently transcribed by the computer software. Please disregard these errors. Please excuse any errors that have escaped final proofreading. Thank you.

## 2021-07-10 NOTE — ED TRIAGE NOTES
Pt woke up around 0300 with rash to chest, pt denies new medications. Pt reports eating shrimp for denies hx of allergy to shellfish. Pt also reports a tickle in her throat. Denies SOB, chest pain or difficulty swallowing.  50 mg PO Benadryl taken prior to EMS arrival.

## 2021-07-29 RX ORDER — DOXEPIN HYDROCHLORIDE 25 MG/1
CAPSULE ORAL
Qty: 120 CAPSULE | Refills: 0 | Status: SHIPPED | OUTPATIENT
Start: 2021-07-29 | End: 2021-08-30

## 2021-08-10 ENCOUNTER — TELEPHONE (OUTPATIENT)
Dept: INTERNAL MEDICINE CLINIC | Age: 81
End: 2021-08-10

## 2021-08-10 NOTE — TELEPHONE ENCOUNTER
Left message stating she only received a 30 day supply of her medication and usually gets a 90 day supply, was instructed by pharmacy to call our office for clarity. Returned call to patient and explained that covering providers are only allowed to send 30 day supply at a time per Dr. Dorys Lira. Patient stated she has an appt with Dr. Delia Wan on 9/2 and will get 90 day supply then. She has enough medication to last till then.

## 2021-08-30 RX ORDER — DOXEPIN HYDROCHLORIDE 25 MG/1
CAPSULE ORAL
Qty: 120 CAPSULE | Refills: 0 | Status: SHIPPED | OUTPATIENT
Start: 2021-08-30 | End: 2021-09-27

## 2021-09-02 ENCOUNTER — OFFICE VISIT (OUTPATIENT)
Dept: INTERNAL MEDICINE CLINIC | Age: 81
End: 2021-09-02
Payer: MEDICARE

## 2021-09-02 VITALS
SYSTOLIC BLOOD PRESSURE: 132 MMHG | DIASTOLIC BLOOD PRESSURE: 72 MMHG | RESPIRATION RATE: 18 BRPM | TEMPERATURE: 98.4 F | OXYGEN SATURATION: 97 % | HEART RATE: 103 BPM | WEIGHT: 124.4 LBS | HEIGHT: 62 IN | BODY MASS INDEX: 22.89 KG/M2

## 2021-09-02 DIAGNOSIS — R73.9 HYPERGLYCEMIA: ICD-10-CM

## 2021-09-02 DIAGNOSIS — E03.9 ACQUIRED HYPOTHYROIDISM: Primary | ICD-10-CM

## 2021-09-02 DIAGNOSIS — E78.00 PURE HYPERCHOLESTEROLEMIA: ICD-10-CM

## 2021-09-02 DIAGNOSIS — M15.8 OTHER OSTEOARTHRITIS INVOLVING MULTIPLE JOINTS: ICD-10-CM

## 2021-09-02 PROCEDURE — G8400 PT W/DXA NO RESULTS DOC: HCPCS | Performed by: INTERNAL MEDICINE

## 2021-09-02 PROCEDURE — G8427 DOCREV CUR MEDS BY ELIG CLIN: HCPCS | Performed by: INTERNAL MEDICINE

## 2021-09-02 PROCEDURE — 99214 OFFICE O/P EST MOD 30 MIN: CPT | Performed by: INTERNAL MEDICINE

## 2021-09-02 PROCEDURE — G9717 DOC PT DX DEP/BP F/U NT REQ: HCPCS | Performed by: INTERNAL MEDICINE

## 2021-09-02 PROCEDURE — 1101F PT FALLS ASSESS-DOCD LE1/YR: CPT | Performed by: INTERNAL MEDICINE

## 2021-09-02 PROCEDURE — 1090F PRES/ABSN URINE INCON ASSESS: CPT | Performed by: INTERNAL MEDICINE

## 2021-09-02 PROCEDURE — G8536 NO DOC ELDER MAL SCRN: HCPCS | Performed by: INTERNAL MEDICINE

## 2021-09-02 PROCEDURE — G8420 CALC BMI NORM PARAMETERS: HCPCS | Performed by: INTERNAL MEDICINE

## 2021-09-02 NOTE — PROGRESS NOTES
1. Have you been to the ER, urgent care clinic since your last visit? Hospitalized since your last visit? Yes When: 7/10/2021 [address]    2. Have you seen or consulted any other health care providers outside of the [address] [place] [address] since your last visit? Include any pap smears or colon screening.  No     Chief Complaint   Patient presents with    Follow-up     6 month        Visit Vitals  /72 (BP 1 Location: Left upper arm, BP Patient Position: Sitting, BP Cuff Size: Adult)   Pulse (!) 103   Temp 98.4 °F (36.9 °C) (Temporal)   Resp 18   Ht 5' 2\" (1.575 m)   Wt 124 lb 6.4 oz (56.4 kg)   SpO2 97%   BMI 22.75 kg/m²

## 2021-09-02 NOTE — PROGRESS NOTES
Dorcas Argueta is a [de-identified] y.o. female and presents with Follow-up (6 month )  Patient presents for follow-up [de-identified]years of age and is soon to relocate in 3 weeks to [place] to be in the vicinity of her son. Her  and daughter passed away few years back and she seems to be presently coping well I am sure she will miss some of her close friends here we discussed some preventive care she has some problems with swallowing where she feels food might get stuck in the upper esophagus or she occasionally coughs she feels this mostly esophageal spasm she did not want endoscopy imaging studies this time as she will be leaving but will establish care with OB/GYN as well as family practitioner there and [place] we reviewed her most recent labs March of this year TSH 1.2 free T4 1.19 white count 5300 hemoglobin 14 glucose 110 creatinine 0.99 calcium 10 SGOT 27 cholesterol 235 HDL 56  hemoglobin A1c 6.0 she wears corrective 0 lenses she is oriented pleasant-she has some stigmata of osteoarthritis         Review of Systems  Constitutional: negative for fevers, chills, anorexia, weight loss and fatigue,no insomnia  Eyes:   negative for visual disturbance and irritation, eye discharge, eye pain. no eye redness. ENT:   negative for tinnitus, sore throat, nasal congestion, ear pain, hoarseness, hearing loss.,no snoring. Respiratory:  negative for cough, hemoptysis, shortness of breath, wheezing,  CV:   negative for chest pain, palpitations, lower extremity edema, shortness of breath while sitting, walking or at night  GI:   negative for nausea, vomiting, diarrhea, abdominal pain,melena,constipation. Endo:               negative for polyuria, polydipsia, polyphagia, cold or heat intolerance,hair loss. Genitourinary: negative for frequency, dysuria and hematuria,urethral discharge,nocturia.straining while urination,urinary incontinence.   Integument:  negative for rash and pruritus  Hematologic:  negative for easy bruising and gum/nose bleeding, enlarged nodes  Musculoskel: negative for myalgias, arthralgias, back pain, muscle weakness, joint pain, h/o fall,cramps,calf pain. Osteoarthritis -neurological:  negative for headaches, dizziness, vertigo, memory problems, gait and seizures loss of consciousness,no ataxia. Behavl/Psych: negative for feelings of anxiety, depression, mood changes ,sadness    Past Medical History:   Diagnosis Date    Allergic rhinitis 8/31/2020    Degenerative joint disease involving multiple joints 8/31/2020    Depressive disorder 8/31/2020    Fatigue 8/31/2020    Hyperglycemia 8/31/2020    Hyperlipidemia 8/31/2020    Hypothyroidism 8/31/2020    Tinnitus 8/31/2020    URI (upper respiratory infection) 8/31/2020     Past Surgical History:   Procedure Laterality Date    HX APPENDECTOMY      HX BREAST LUMPECTOMY Right     HX COLONOSCOPY  2019    HX OTHER SURGICAL      Repair of canthus with graft of skin    HX TONSILLECTOMY      [place] ABDOMEN SURGERY PROC UNLISTED      abdoninoplasty     Social History     Socioeconomic History    Marital status:      Spouse name: Not on file    Number of children: Not on file    Years of education: Not on file    Highest education level: Not on file   Tobacco Use    Smoking status: Never Smoker    Smokeless tobacco: Never Used   Vaping Use    Vaping Use: Never used     Social Determinants of Health     Financial Resource Strain:     Difficulty of Paying Living Expenses:    Food Insecurity:     Worried About Running Out of Food in the Last Year:     Ran Out of Food in the Last Year:    Transportation Needs:     Lack of Transportation (Medical):      Lack of Transportation (Non-Medical):    Physical Activity:     Days of Exercise per Week:     Minutes of Exercise per Session:    Stress:     Feeling of Stress :    Social Connections:     Frequency of Communication with Friends and Family:     Frequency of Social Gatherings with Friends and Family:     Attends Christianity Services:     Active Member of Clubs or Organizations:     Attends Club or Organization Meetings:     Marital Status:      Family History   Problem Relation Age of Onset    Stroke Mother     Heart Attack Father     Cancer Father     Hypertension Sister      Current Outpatient Medications   Medication Sig Dispense Refill    multivitamin, tx-iron-ca-min (THERA-M w/ IRON) 9 mg iron-400 mcg tab tablet Take 1 Tablet by mouth daily.  doxepin (SINEquan) 25 mg capsule TAKE 1 CAPSULE EVERY       MORNING AND 3 CAPSULES     EVERY NIGHT 120 Capsule 0    LevoxyL 50 mcg tablet TAKE 1 TABLET DAILY 90 Tab 1    pantoprazole (PROTONIX) 40 mg tablet TAKE 1 TABLET DAILY 90 Tab 1    pilocarpine (SALAGEN) 5 mg tablet TAKE 1 TABLET 3 TIMES A  Tab 1    Biotin 2,500 mcg cap Take 1 Cap by mouth daily.  aspirin delayed-release 81 mg tablet Take 81 mg by mouth daily. (Patient not taking: Reported on 9/2/2021)       Allergies   Allergen Reactions    Latex Unknown (comments)    Amoxicillin Unknown (comments)    Codeine Other (comments)    Sulfa (Sulfonamide Antibiotics) Itching       Objective:  Visit Vitals  /72 (BP 1 Location: Left upper arm, BP Patient Position: Sitting, BP Cuff Size: Adult)   Pulse (!) 103   Temp 98.4 °F (36.9 °C) (Temporal)   Resp 18   Ht 5' 2\" (1.575 m)   Wt 124 lb 6.4 oz (56.4 kg)   SpO2 97%   BMI 22.75 kg/m²       Physical Exam:   Constitutional: General Appearance: healthy-appearing and obese. Level of Distress: NAD. Ambulation: ambulating normally. Psychiatric: Mental Status: normal mood and affect and active and alert. Orientation: to time, place, and person. no agitation. ,normal eye contact. normal insight  Head: Head: normocephalic and atraumatic. Eyes: Pupils: PERRLA. Sclerae: non-icteric. ENMT: No lesions on external ear, no hearing loss. No lesions on external nose, sinus tenderness, or nasal discharge.  Lips, Teeth, and no mouth or lip ulcers   Neck: Neck: supple, trachea midline, and no masses. Lymph Nodes: no cervical LAD. Thyroid: no enlargement or nodules and non-tender. Lungs: Respiratory effort: no dyspnea. Auscultation: no wheezing, rales/crackles, or rhonchi and breath sounds nomal and good air movemen  Cardiovascular: Apical Impulse: not displaced. Heart Auscultation: normal S1 and S2; no murmurs, rubs, or gallops; and RRR. Neck vessels: no carotid bruits. Pulses including femoral / pedal: normal throughout. Abdomen: Bowel Sounds: normal. Inspection and Palpation: no tenderness, guarding, or masses and soft and non-distended. Liver: non-tender and no hepatomegaly. Musculoskeletal[de-identified] Extremities: no edema or varicosities. Calf tenderness. Neurologic: Gait and Station: normal gait and station. Motor Strength normal right and left. Sensory and cerebellar intact. DJD changes  Skin: Inspection and palpation: no rash, lesions, or ulcer. Results for orders placed or performed in visit on 03/02/21   CBC WITH AUTOMATED DIFF   Result Value Ref Range    WBC 5.6 3.4 - 10.8 x10E3/uL    RBC 5.21 3.77 - 5.28 x10E6/uL    HGB 14.6 11.1 - 15.9 g/dL    HCT 43.9 34.0 - 46.6 %    MCV 84 79 - 97 fL    MCH 28.0 26.6 - 33.0 pg    MCHC 33.3 31.5 - 35.7 g/dL    RDW 13.0 11.7 - 15.4 %    PLATELET 557 112 - 244 x10E3/uL    NEUTROPHILS 52 Not Estab. %    Lymphocytes 31 Not Estab. %    MONOCYTES 11 Not Estab. %    EOSINOPHILS 4 Not Estab. %    BASOPHILS 1 Not Estab. %    ABS. NEUTROPHILS 3.0 1.4 - 7.0 x10E3/uL    Abs Lymphocytes 1.8 0.7 - 3.1 x10E3/uL    ABS. MONOCYTES 0.6 0.1 - 0.9 x10E3/uL    ABS. EOSINOPHILS 0.2 0.0 - 0.4 x10E3/uL    ABS. BASOPHILS 0.0 0.0 - 0.2 x10E3/uL    IMMATURE GRANULOCYTES 1 Not Estab. %    ABS. IMM.  GRANS. 0.0 0.0 - 0.1 x10E3/uL   HEMOGLOBIN A1C WITH EAG   Result Value Ref Range    Hemoglobin A1c 5.8 (H) 4.8 - 5.6 %    Estimated average glucose 534 mg/dL   METABOLIC PANEL, COMPREHENSIVE   Result Value Ref Range Glucose 94 65 - 99 mg/dL    BUN 15 8 - 27 mg/dL    Creatinine 0.92 0.57 - 1.00 mg/dL    GFR est non-AA 59 (L) >59 mL/min/1.73    GFR est AA 68 >59 mL/min/1.73    BUN/Creatinine ratio 16 12 - 28    Sodium 140 134 - 144 mmol/L    Potassium 5.4 (H) 3.5 - 5.2 mmol/L    Chloride 99 96 - 106 mmol/L    CO2 24 20 - 29 mmol/L    Calcium 10.9 (H) 8.7 - 10.3 mg/dL    Protein, total 7.2 6.0 - 8.5 g/dL    Albumin 4.8 (H) 3.7 - 4.7 g/dL    GLOBULIN, TOTAL 2.4 1.5 - 4.5 g/dL    A-G Ratio 2.0 1.2 - 2.2    Bilirubin, total 0.4 0.0 - 1.2 mg/dL    Alk. phosphatase 86 39 - 117 IU/L    AST (SGOT) 27 0 - 40 IU/L    ALT (SGPT) 19 0 - 32 IU/L   TSH 3RD GENERATION   Result Value Ref Range    TSH 1.810 0.450 - 4.500 uIU/mL   T4, FREE   Result Value Ref Range    T4, Free 1.36 0.82 - 1.77 ng/dL   LIPID PANEL   Result Value Ref Range    Cholesterol, total 238 (H) 100 - 199 mg/dL    Triglyceride 319 (H) 0 - 149 mg/dL    HDL Cholesterol 47 >39 mg/dL    VLDL, calculated 57 (H) 5 - 40 mg/dL    LDL, calculated 134 (H) 0 - 99 mg/dL       Assessment/Plan:    ICD-10-CM ICD-9-CM    1. Acquired hypothyroidism  E03.9 244.9    2. Hyperglycemia  R73.9 790.29    3. Pure hypercholesterolemia  E78.00 272.0    4. Other osteoarthritis involving multiple joints  M15.8 715.89      Orders Placed This Encounter    multivitamin, tx-iron-ca-min (THERA-M w/ IRON) 9 mg iron-400 mcg tab tablet     Sig: Take 1 Tablet by mouth daily. increase physical activity, continue present plan, call if any problems    There are no Patient Instructions on file for this visit.

## 2021-09-27 RX ORDER — DOXEPIN HYDROCHLORIDE 25 MG/1
CAPSULE ORAL
Qty: 120 CAPSULE | Refills: 0 | Status: SHIPPED | OUTPATIENT
Start: 2021-09-27 | End: 2021-10-26

## 2022-03-18 PROBLEM — M15.9 DEGENERATIVE JOINT DISEASE INVOLVING MULTIPLE JOINTS: Status: ACTIVE | Noted: 2020-08-31

## 2022-03-19 PROBLEM — E78.5 HYPERLIPIDEMIA: Status: ACTIVE | Noted: 2020-08-31

## 2022-03-19 PROBLEM — E03.9 HYPOTHYROIDISM: Status: ACTIVE | Noted: 2020-08-31

## 2022-03-19 PROBLEM — R53.83 FATIGUE: Status: ACTIVE | Noted: 2020-08-31

## 2022-03-19 PROBLEM — R73.9 HYPERGLYCEMIA: Status: ACTIVE | Noted: 2020-08-31

## 2022-03-19 PROBLEM — J30.9 ALLERGIC RHINITIS: Status: ACTIVE | Noted: 2020-08-31

## 2022-03-19 PROBLEM — H93.19 TINNITUS: Status: ACTIVE | Noted: 2020-08-31

## 2022-03-20 PROBLEM — J06.9 URI (UPPER RESPIRATORY INFECTION): Status: ACTIVE | Noted: 2020-08-31

## 2022-03-20 PROBLEM — F32.A DEPRESSIVE DISORDER: Status: ACTIVE | Noted: 2020-08-31

## 2022-08-29 ENCOUNTER — HOSPITAL ENCOUNTER (EMERGENCY)
Age: 82
Discharge: HOME | End: 2022-08-29
Attending: EMERGENCY MEDICINE
Payer: MEDICARE

## 2022-08-29 VITALS
BODY MASS INDEX: 18.88 KG/M2 | HEIGHT: 61 IN | TEMPERATURE: 98.3 F | DIASTOLIC BLOOD PRESSURE: 78 MMHG | WEIGHT: 100 LBS | SYSTOLIC BLOOD PRESSURE: 166 MMHG | HEART RATE: 76 BPM | OXYGEN SATURATION: 97 % | RESPIRATION RATE: 18 BRPM

## 2022-08-29 DIAGNOSIS — R68.2 DRY MOUTH: Primary | ICD-10-CM

## 2022-08-29 DIAGNOSIS — T88.7XXA MEDICATION SIDE EFFECT: ICD-10-CM

## 2022-08-29 PROCEDURE — 99283 EMERGENCY DEPT VISIT LOW MDM: CPT

## 2022-08-29 NOTE — ED NOTES
Pt relates a hx of dry mouth. Pt states that after taking cough medicine tonight her dry mouth worsened and she felt like her throat was dry as well. Pt is able to manage her secretions, no oral swelling, and no difficulties breathing at this time.

## 2022-08-29 NOTE — ED PROVIDER NOTES
EMERGENCY DEPARTMENT HISTORY AND PHYSICAL EXAM      Date: 8/29/2022  Patient Name: Saint Lucia    History of Presenting Illness     Chief Complaint   Patient presents with    Other       History Provided By: Patient    HPI: [place] Lucia, 81 y.o. female presenting with feeling of severe dry mouth and tongue swelling. Patient states she has had a little sinus pressure and drainage so she took some cough medicine. This is her second dose of Delsym. She has chronic dry mouth and takes pilocarpine for this. She has not missed any doses of this medication. She took the Delsym around 10 PM and woke up about 40 minutes ago feeling like her tongue was swollen and dry. The sensation of swelling has improved but still having dryness. No other new medications. No ace inhibitors. There are no other complaints, changes, or physical findings at this time. PCP: Romayne Slocumb, MD    No current facility-administered medications on file prior to encounter. Current Outpatient Medications on File Prior to Encounter   Medication Sig Dispense Refill    doxepin (SINEquan) 25 mg capsule TAKE 1 CAPSULE EVERY       MORNING AND 3 CAPSULES     EVERY NIGHT 120 Capsule 2    multivitamin, tx-iron-ca-min (THERA-M w/ IRON) 9 mg iron-400 mcg tab tablet Take 1 Tablet by mouth daily. LevoxyL 50 mcg tablet TAKE 1 TABLET DAILY 90 Tab 1    pantoprazole (PROTONIX) 40 mg tablet TAKE 1 TABLET DAILY 90 Tab 1    pilocarpine (SALAGEN) 5 mg tablet TAKE 1 TABLET 3 TIMES A  Tab 1    aspirin delayed-release 81 mg tablet Take 81 mg by mouth daily. (Patient not taking: Reported on 9/2/2021)      Biotin 2,500 mcg cap Take 1 Cap by mouth daily.          Past History     Past Medical History:  Past Medical History:   Diagnosis Date    Allergic rhinitis 8/31/2020    Degenerative joint disease involving multiple joints 8/31/2020    Depressive disorder 8/31/2020    Fatigue 8/31/2020    Hyperglycemia 8/31/2020    Hyperlipidemia 8/31/2020 Hypothyroidism 8/31/2020    Tinnitus 8/31/2020    URI (upper respiratory infection) 8/31/2020       Past Surgical History:  Past Surgical History:   Procedure Laterality Date    HX APPENDECTOMY      HX BREAST LUMPECTOMY Right     HX COLONOSCOPY  2019    HX OTHER SURGICAL      Repair of canthus with graft of skin    HX TONSILLECTOMY      [place] ABDOMEN SURGERY PROC UNLISTED      abdoninoplasty       Family History:  Family History   Problem Relation Age of Onset    Stroke Mother     Heart Attack Father     Cancer Father     Hypertension Sister        Social History:  Social History     Tobacco Use    Smoking status: Never    Smokeless tobacco: Never   Vaping Use    Vaping Use: Never used       Allergies: Allergies   Allergen Reactions    Latex Unknown (comments)    Amoxicillin Unknown (comments)    Codeine Other (comments)    Sulfa (Sulfonamide Antibiotics) Itching       Review of Systems   Review of Systems   Constitutional:  Negative for chills and fever. HENT:  Positive for rhinorrhea and sinus pressure. Negative for drooling, facial swelling and sore throat. Respiratory:  Negative for cough, shortness of breath and wheezing. Skin:  Negative for rash and wound. Neurological:  Negative for facial asymmetry. Physical Exam   Physical Exam  Vitals and nursing note reviewed. Constitutional:       General: She is not in acute distress. Appearance: Normal appearance. HENT:      Head: Normocephalic and atraumatic. Mouth/Throat:      Mouth: Mucous membranes are dry. Comments: No obvious tongue swelling. Tongue midline  Eyes:      Conjunctiva/sclera: Conjunctivae normal.   Pulmonary:      Effort: Pulmonary effort is normal. No respiratory distress. Musculoskeletal:      Cervical back: Normal range of motion. Skin:     General: Skin is warm and dry. Neurological:      General: No focal deficit present. Mental Status: She is alert and oriented to person, place, and time.  Mental status is at baseline. Lab and Diagnostic Study Results   Labs -   No results found for this or any previous visit (from the past 12 hour(s)). Radiologic Studies -   @lastxrresult@  CT Results  (Last 48 hours)      None          CXR Results  (Last 48 hours)      None            Medical Decision Making and ED Course   Differential Diagnosis & Medical Decision Making Provider Note:   Patient with chronic dry mouth here with worsening symptoms after taking Delsym which has a side effect of causing dry mouth. No evidence of tongue swelling/angioedema. Monitored in ER with no worsenign. Stable for discharge    - I am the first provider for this patient. I reviewed the vital signs, available nursing notes, past medical history, past surgical history, family history and social history. The patients presenting problems have been discussed, and they are in agreement with the care plan formulated and outlined with them. I have encouraged them to ask questions as they arise throughout their visit. Vital Signs-Reviewed the patient's vital signs. Patient Vitals for the past 12 hrs:   Temp Pulse Resp BP SpO2   08/29/22 0331 98.3 °F (36.8 °C) 98 17 (!) 175/85 94 %       ED Course:            Disposition   Disposition: DC- Adult Discharges: All of the diagnostic tests were reviewed and questions answered. Diagnosis, care plan and treatment options were discussed. The patient understands the instructions and will follow up as directed. The patients results have been reviewed with them. They have been counseled regarding their diagnosis. The patient verbally convey understanding and agreement of the signs, symptoms, diagnosis, treatment and prognosis and additionally agrees to follow up as recommended with their PCP in 24 - 48 hours. They also agree with the care-plan and convey that all of their questions have been answered.   I have also put together some discharge instructions for them that include: 1) educational information regarding their diagnosis, 2) how to care for their diagnosis at home, as well a 3) list of reasons why they would want to return to the ED prior to their follow-up appointment, should their condition change. DISCHARGE PLAN:  1. Current Discharge Medication List        CONTINUE these medications which have NOT CHANGED    Details   doxepin (SINEquan) 25 mg capsule TAKE 1 CAPSULE EVERY       MORNING AND 3 CAPSULES     EVERY NIGHT  Qty: 120 Capsule, Refills: 2      multivitamin, tx-iron-ca-min (THERA-M w/ IRON) 9 mg iron-400 mcg tab tablet Take 1 Tablet by mouth daily. LevoxyL 50 mcg tablet TAKE 1 TABLET DAILY  Qty: 90 Tab, Refills: 1      pantoprazole (PROTONIX) 40 mg tablet TAKE 1 TABLET DAILY  Qty: 90 Tab, Refills: 1      pilocarpine (SALAGEN) 5 mg tablet TAKE 1 TABLET 3 TIMES A DAY  Qty: 270 Tab, Refills: 1      aspirin delayed-release 81 mg tablet Take 81 mg by mouth daily. Biotin 2,500 mcg cap Take 1 Cap by mouth daily. 2.   Follow-up Information       Follow up With Specialties Details Why Contact Info    Harpreet Da Silva MD Internal Medicine Physician   [address]  [phone]            3. Return to ED if worse   4. Current Discharge Medication List            Diagnosis/Clinical Impression     Clinical Impression:   1. Dry mouth    2. Medication side effect        Attestations: Yogi Martins MD, am the primary clinician of record. Please note that this dictation was completed with [employer], the computer voice recognition software. Quite often unanticipated grammatical, syntax, homophones, and other interpretive errors are inadvertently transcribed by the computer software. Please disregard these errors. Please excuse any errors that have escaped final proofreading. Thank you.

## 2022-08-29 NOTE — ED TRIAGE NOTES
Patient says that she took cough medication around 2300 last night before going to bed, she woke up feeling like her tongue was swollen and mouth was dry.  Patient takes medications for dry mouth daily, no swelling or respiratory distress noted on arrival
